# Patient Record
Sex: FEMALE | Race: WHITE | URBAN - METROPOLITAN AREA
[De-identification: names, ages, dates, MRNs, and addresses within clinical notes are randomized per-mention and may not be internally consistent; named-entity substitution may affect disease eponyms.]

---

## 2023-05-22 ENCOUNTER — OFFICE VISIT (OUTPATIENT)
Facility: LOCATION | Age: 68
End: 2023-05-22
Payer: MEDICARE

## 2023-05-22 DIAGNOSIS — H18.49 OTHER CORNEAL DEGENERATION: ICD-10-CM

## 2023-05-22 DIAGNOSIS — H04.123 DRY EYE SYNDROME OF BILATERAL LACRIMAL GLANDS: ICD-10-CM

## 2023-05-22 DIAGNOSIS — H25.812 COMBINED FORMS OF AGE-RELATED CATARACT, LEFT EYE: ICD-10-CM

## 2023-05-22 DIAGNOSIS — H16.223 KERATOCONJUNCT SICCA, NOT SPECIFIED AS SJOGREN'S, BILATERAL: ICD-10-CM

## 2023-05-22 DIAGNOSIS — H53.2 DIPLOPIA: Primary | ICD-10-CM

## 2023-05-22 PROCEDURE — 76514 ECHO EXAM OF EYE THICKNESS: CPT | Performed by: OPHTHALMOLOGY

## 2023-05-22 PROCEDURE — 92025 CPTRIZED CORNEAL TOPOGRAPHY: CPT | Performed by: OPHTHALMOLOGY

## 2023-05-22 PROCEDURE — 99214 OFFICE O/P EST MOD 30 MIN: CPT | Performed by: OPHTHALMOLOGY

## 2023-05-22 RX ORDER — POLYMYXIN B SULFATE AND TRIMETHOPRIM SULFATE 100000; 1 [USP'U]/ML; MG/ML
SOLUTION/ DROPS OPHTHALMIC
Qty: 10 | Refills: 3 | Status: INACTIVE
Start: 2023-05-22 | End: 2023-05-22

## 2023-05-22 RX ORDER — ACETAMINOPHEN 325 MG/1
325 MG TABLET, FILM COATED ORAL
Refills: 0 | Status: ACTIVE
Start: 2023-05-22

## 2023-05-22 RX ORDER — ALPRAZOLAM 0.25 MG/1
0.25 MG TABLET ORAL PRN
Refills: 0 | Status: ACTIVE
Start: 2023-05-22

## 2023-05-22 ASSESSMENT — VISUAL ACUITY
OS: 20/20
OD: 20/20

## 2023-05-22 NOTE — IMPRESSION/PLAN
Impression: Diplopia: H53.2. Plan: Explained to pt that her vision is still changing and healing from her surgery. Explained to pt that as long as she is seeing well with her current glasses, there is no need to change her glasses. But if she would like to change them, she can. Referred to Dr. Katharina Garcia for exam and glasses for driving. Pt would like to hold off on obtaining a new pair of glasses at this time.

## 2023-05-22 NOTE — IMPRESSION/PLAN
Impression: Combined forms of age-related cataract, left eye: H25.812. Plan: No surgery at present until OD has stabilized.

## 2023-05-22 NOTE — IMPRESSION/PLAN
Impression: Keratoconjunct sicca, not specified as Sjogren's, bilateral: Y22.326. Plan: Same as above.

## 2023-05-22 NOTE — IMPRESSION/PLAN
Impression: Dry eye syndrome of bilateral lacrimal glands: H04.123.
h/o CE/Vivity multifocal TORIC [id number] IOL OD (09/13/22), goal: distance OD
OD dominant. Dots at 29 Plan: No LVC enhancement at present until FAUSTINO is adequately treated. Recommended patient start hot compresses QD-BID OU with electric dry eye mask for 20 mins on medium high heat. Pt advised to gently express her oil glands following the use of her hot compresses. Recommended patient use Systane Complete PF QID OU. RTC in 3 months for re-evaluation with Dr. Momo Kearney. No IOP check. No gtts. 5 min FL test at that visit. If FAUSTINO is adequately treated, then discuss plan for LVC enhancement OD.

## 2023-08-21 ENCOUNTER — OFFICE VISIT (OUTPATIENT)
Facility: LOCATION | Age: 68
End: 2023-08-21
Payer: MEDICARE

## 2023-08-21 DIAGNOSIS — H04.123 DRY EYE SYNDROME OF BILATERAL LACRIMAL GLANDS: Primary | ICD-10-CM

## 2023-08-21 DIAGNOSIS — H18.49 OTHER CORNEAL DEGENERATION: ICD-10-CM

## 2023-08-21 DIAGNOSIS — H25.812 COMBINED FORMS OF AGE-RELATED CATARACT, LEFT EYE: ICD-10-CM

## 2023-08-21 DIAGNOSIS — H16.223 KERATOCONJUNCT SICCA, NOT SPECIFIED AS SJOGREN'S, BILATERAL: ICD-10-CM

## 2023-08-21 PROCEDURE — 99213 OFFICE O/P EST LOW 20 MIN: CPT | Performed by: OPHTHALMOLOGY
